# Patient Record
Sex: MALE | Race: WHITE | NOT HISPANIC OR LATINO | Employment: OTHER | URBAN - NONMETROPOLITAN AREA
[De-identification: names, ages, dates, MRNs, and addresses within clinical notes are randomized per-mention and may not be internally consistent; named-entity substitution may affect disease eponyms.]

---

## 2018-04-24 ENCOUNTER — OFFICE VISIT (OUTPATIENT)
Dept: CARDIOLOGY | Facility: PHYSICIAN GROUP | Age: 73
End: 2018-04-24
Payer: MEDICARE

## 2018-04-24 VITALS
DIASTOLIC BLOOD PRESSURE: 76 MMHG | OXYGEN SATURATION: 93 % | WEIGHT: 210 LBS | SYSTOLIC BLOOD PRESSURE: 120 MMHG | BODY MASS INDEX: 29.4 KG/M2 | HEIGHT: 71 IN | HEART RATE: 96 BPM

## 2018-04-24 DIAGNOSIS — I48.0 PAROXYSMAL ATRIAL FIBRILLATION (HCC): ICD-10-CM

## 2018-04-24 DIAGNOSIS — I31.9 DISEASE OF PERICARDIUM: ICD-10-CM

## 2018-04-24 DIAGNOSIS — E78.00 PURE HYPERCHOLESTEROLEMIA: ICD-10-CM

## 2018-04-24 PROCEDURE — 99214 OFFICE O/P EST MOD 30 MIN: CPT | Performed by: INTERNAL MEDICINE

## 2018-04-24 ASSESSMENT — ENCOUNTER SYMPTOMS
DEPRESSION: 0
BACK PAIN: 0
NERVOUS/ANXIOUS: 0
HEARTBURN: 0
LOSS OF CONSCIOUSNESS: 0
WEIGHT LOSS: 1
SHORTNESS OF BREATH: 0
MYALGIAS: 0
NAUSEA: 0
PALPITATIONS: 0
DIZZINESS: 0
ABDOMINAL PAIN: 0
BRUISES/BLEEDS EASILY: 0
EYES NEGATIVE: 1
INSOMNIA: 0

## 2018-04-24 NOTE — LETTER
[facility]-[address] [address]  Phone: [phone]  Fax: [phone]              Kelechi Rushing  1945    Encounter Date: 4/24/2018    Angeli Esparza M.D.          PROGRESS NOTE:  Chief Complaint   Patient presents with   • Atrial Fibrillation     Follow up   • Hypertension       Subjective:   Kelechi Rushing is a 72 y.o. male who presents today   In f/u in regards to his pAF and pericarditis in 2011    -stress at home, lots of back taxes  -wife stressed too  -from [place]    -recently dx with DM by PCP who asked him to check in with us again  -does not want to take DM meds  -wt loss trying - changing diet    Past Medical History:   Diagnosis Date   • ASTHMA    • Atrial fibrillation (CMS-HCC)     Parox, 2011   • Backpain    • Hypertension    • Indigestion    • Paroxysmal atrial fibrillation (CMS-[facility])     -parox, 2011    • Pericarditis 8/2012    Followed by [facility]   • Psychiatric problem     DEPRESSION   • Seasonal allergies    • Unspecified disease of pericardium     abnl echo, normal echo 2011   • Unspecified essential hypertension    • Vertigo      History reviewed. No pertinent surgical history.  Family History   Problem Relation Age of Onset   • Heart Disease Neg Hx    • Heart Attack Neg Hx      Social History     Social History   • Marital status:      Spouse name: N/A   • Number of children: N/A   • Years of education: N/A     Occupational History   • Not on file.     Social History Main Topics   • Smoking status: Never Smoker   • Smokeless tobacco: Never Used   • Alcohol use Yes      Comment: one glass of wine or beer with dinner   • Drug use: No   • Sexual activity: No     Other Topics Concern   • Not on file     Social History Narrative   • No narrative on file     Allergies   Allergen Reactions   • Bloodless    • Morphine      Outpatient Encounter Prescriptions as of 4/24/2018   Medication Sig Dispense Refill   •  "lansoprazole (PREVACID) 30 MG CAPSULE DELAYED RELEASE Take 30 mg by mouth every day.     • vitamin D, Ergocalciferol, (DRISDOL) 64341 UNITS Cap capsule Take  by mouth every 7 days.     • lisinopril (PRINIVIL) 10 MG Tab Take 10 mg by mouth every day.     • aspirin (ASA) 81 MG Chew Tab chewable tablet Take 81 mg by mouth every day.     • [DISCONTINUED] naproxen (NAPROSYN) 375 MG Tab Take 1 Tab by mouth 2 times a day, with meals. (Patient not taking: Reported on 4/24/2018) 60 Tab 0   • [DISCONTINUED] omeprazole (PRILOSEC) 20 MG delayed-release capsule Take 20 mg by mouth every day.       No facility-administered encounter medications on file as of 4/24/2018.      Review of Systems   Constitutional: Positive for weight loss. Negative for malaise/fatigue.   HENT: Negative for congestion and hearing loss.    Eyes: Negative.    Respiratory: Negative for shortness of breath.    Cardiovascular: Negative for chest pain, palpitations and leg swelling.   Gastrointestinal: Negative for abdominal pain, heartburn and nausea.   Musculoskeletal: Negative for back pain and myalgias.   Neurological: Negative for dizziness and loss of consciousness.   Endo/Heme/Allergies: Does not bruise/bleed easily.   Psychiatric/Behavioral: Negative for depression. The patient is not nervous/anxious and does not have insomnia.    All other systems reviewed and are negative.       Objective:   /76   Pulse 96   Ht 1.803 m (5' 11\")   Wt 95.3 kg (210 lb)   SpO2 93%   BMI 29.29 kg/m²      Physical Exam   Constitutional: He is oriented to person, place, and time. He appears well-developed and well-nourished.   obese   HENT:   Head: Normocephalic and atraumatic.   Eyes: EOM are normal. Pupils are equal, round, and reactive to light. No scleral icterus.   Neck: No JVD present. No thyromegaly present.   Cardiovascular: Normal rate and regular rhythm.  Exam reveals no friction rub.    No murmur heard.  Pulmonary/Chest: Breath sounds normal. No " respiratory distress. He exhibits no tenderness.   Abdominal: Bowel sounds are normal. He exhibits no distension.   Musculoskeletal: Normal range of motion. He exhibits no edema.   Lymphadenopathy:     He has no cervical adenopathy.   Neurological: He is alert and oriented to person, place, and time. He exhibits normal muscle tone. Coordination normal.   Skin: Skin is warm and dry. No rash noted.   Psychiatric: He has a normal mood and affect. His behavior is normal.       Assessment:     1. Disease of pericardium     2. Paroxysmal atrial fibrillation (CMS-HCC)     3. Pure hypercholesterolemia         Medical Decision Making:  Today's Assessment / Status / Plan:       PAF  Resolved  Normal exam today  Normal echo 2011  Spoke about sxs and risks   Asa  Follows with annual labs with PCP, reviewed from March    Lipids  Much too high - TG notably  Again discussed statin - try crestor 5  Wants to d/w PCP  DM control will be paramount, went over diet again    Pericarditis  Normal echo 2011  Watchful waiting  Long discussion about pericarditis    F/u here as needed  With DM - discussed merits of MPI, wants to optimize diet 1st        Vlad Curry M.D.  [address] [facility] [address]  VIA Facsimile: [phone]

## 2018-04-24 NOTE — PROGRESS NOTES
Chief Complaint   Patient presents with   • Atrial Fibrillation     Follow up   • Hypertension       Subjective:   Kelechi Rushing is a 72 y.o. male who presents today   In f/u in regards to his pAF and pericarditis in 2011    -stress at home, lots of back taxes  -wife stressed too  -from [place]    -recently dx with DM by PCP who asked him to check in with us again  -does not want to take DM meds  -wt loss trying - changing diet    Past Medical History:   Diagnosis Date   • ASTHMA    • Atrial fibrillation (CMS-[facility])     Parox, 2011   • Backpain    • Hypertension    • Indigestion    • Paroxysmal atrial fibrillation (CMS-HCC)     -parox, 2011    • Pericarditis 8/2012    Followed by P   • Psychiatric problem     DEPRESSION   • Seasonal allergies    • Unspecified disease of pericardium     abnl echo, normal echo 2011   • Unspecified essential hypertension    • Vertigo      History reviewed. No pertinent surgical history.  Family History   Problem Relation Age of Onset   • Heart Disease Neg Hx    • Heart Attack Neg Hx      Social History     Social History   • Marital status:      Spouse name: N/A   • Number of children: N/A   • Years of education: N/A     Occupational History   • Not on file.     Social History Main Topics   • Smoking status: Never Smoker   • Smokeless tobacco: Never Used   • Alcohol use Yes      Comment: one glass of wine or beer with dinner   • Drug use: No   • Sexual activity: No     Other Topics Concern   • Not on file     Social History Narrative   • No narrative on file     Allergies   Allergen Reactions   • Bloodless    • Morphine      Outpatient Encounter Prescriptions as of 4/24/2018   Medication Sig Dispense Refill   • lansoprazole (PREVACID) 30 MG CAPSULE DELAYED RELEASE Take 30 mg by mouth every day.     • vitamin D, Ergocalciferol, (DRISDOL) 09724 UNITS Cap capsule Take  by mouth every 7 days.     • lisinopril (PRINIVIL) 10 MG Tab Take 10 mg by mouth every day.     • aspirin (ASA) 81  "MG Chew Tab chewable tablet Take 81 mg by mouth every day.     • [DISCONTINUED] naproxen (NAPROSYN) 375 MG Tab Take 1 Tab by mouth 2 times a day, with meals. (Patient not taking: Reported on 4/24/2018) 60 Tab 0   • [DISCONTINUED] omeprazole (PRILOSEC) 20 MG delayed-release capsule Take 20 mg by mouth every day.       No facility-administered encounter medications on file as of 4/24/2018.      Review of Systems   Constitutional: Positive for weight loss. Negative for malaise/fatigue.   HENT: Negative for congestion and hearing loss.    Eyes: Negative.    Respiratory: Negative for shortness of breath.    Cardiovascular: Negative for chest pain, palpitations and leg swelling.   Gastrointestinal: Negative for abdominal pain, heartburn and nausea.   Musculoskeletal: Negative for back pain and myalgias.   Neurological: Negative for dizziness and loss of consciousness.   Endo/Heme/Allergies: Does not bruise/bleed easily.   Psychiatric/Behavioral: Negative for depression. The patient is not nervous/anxious and does not have insomnia.    All other systems reviewed and are negative.       Objective:   /76   Pulse 96   Ht 1.803 m (5' 11\")   Wt 95.3 kg (210 lb)   SpO2 93%   BMI 29.29 kg/m²     Physical Exam   Constitutional: He is oriented to person, place, and time. He appears well-developed and well-nourished.   obese   HENT:   Head: Normocephalic and atraumatic.   Eyes: EOM are normal. Pupils are equal, round, and reactive to light. No scleral icterus.   Neck: No JVD present. No thyromegaly present.   Cardiovascular: Normal rate and regular rhythm.  Exam reveals no friction rub.    No murmur heard.  Pulmonary/Chest: Breath sounds normal. No respiratory distress. He exhibits no tenderness.   Abdominal: Bowel sounds are normal. He exhibits no distension.   Musculoskeletal: Normal range of motion. He exhibits no edema.   Lymphadenopathy:     He has no cervical adenopathy.   Neurological: He is alert and oriented to " person, place, and time. He exhibits normal muscle tone. Coordination normal.   Skin: Skin is warm and dry. No rash noted.   Psychiatric: He has a normal mood and affect. His behavior is normal.       Assessment:     1. Disease of pericardium     2. Paroxysmal atrial fibrillation (CMS-HCC)     3. Pure hypercholesterolemia         Medical Decision Making:  Today's Assessment / Status / Plan:       PAF  Resolved  Normal exam today  Normal echo 2011  Spoke about sxs and risks   Asa  Follows with annual labs with PCP, reviewed from March    Lipids  Much too high - TG notably  Again discussed statin - try crestor 5  Wants to d/w PCP  DM control will be paramount, went over diet again    Pericarditis  Normal echo 2011  Watchful waiting  Long discussion about pericarditis    F/u here as needed  With DM - discussed merits of MPI, wants to optimize diet 1st

## 2019-05-08 ENCOUNTER — APPOINTMENT (OUTPATIENT)
Dept: RADIOLOGY | Facility: MEDICAL CENTER | Age: 74
End: 2019-05-08
Attending: EMERGENCY MEDICINE
Payer: MEDICARE

## 2019-05-08 ENCOUNTER — HOSPITAL ENCOUNTER (OUTPATIENT)
Facility: MEDICAL CENTER | Age: 74
End: 2019-05-10
Attending: EMERGENCY MEDICINE | Admitting: HOSPITALIST
Payer: MEDICARE

## 2019-05-08 DIAGNOSIS — R07.9 ACUTE CHEST PAIN: ICD-10-CM

## 2019-05-08 LAB
ALBUMIN SERPL BCP-MCNC: 4.7 G/DL (ref 3.2–4.9)
ALBUMIN/GLOB SERPL: 1.8 G/DL
ALP SERPL-CCNC: 72 U/L (ref 30–99)
ALT SERPL-CCNC: 50 U/L (ref 2–50)
ANION GAP SERPL CALC-SCNC: 13 MMOL/L (ref 0–11.9)
AST SERPL-CCNC: 27 U/L (ref 12–45)
BASOPHILS # BLD AUTO: 0.8 % (ref 0–1.8)
BASOPHILS # BLD: 0.08 K/UL (ref 0–0.12)
BILIRUB SERPL-MCNC: 0.7 MG/DL (ref 0.1–1.5)
BUN SERPL-MCNC: 13 MG/DL (ref 8–22)
CALCIUM SERPL-MCNC: 9.5 MG/DL (ref 8.5–10.5)
CHLORIDE SERPL-SCNC: 101 MMOL/L (ref 96–112)
CO2 SERPL-SCNC: 23 MMOL/L (ref 20–33)
CREAT SERPL-MCNC: 0.87 MG/DL (ref 0.5–1.4)
EKG IMPRESSION: NORMAL
EOSINOPHIL # BLD AUTO: 0.08 K/UL (ref 0–0.51)
EOSINOPHIL NFR BLD: 0.8 % (ref 0–6.9)
ERYTHROCYTE [DISTWIDTH] IN BLOOD BY AUTOMATED COUNT: 43 FL (ref 35.9–50)
GLOBULIN SER CALC-MCNC: 2.6 G/DL (ref 1.9–3.5)
GLUCOSE SERPL-MCNC: 128 MG/DL (ref 65–99)
HCT VFR BLD AUTO: 51.3 % (ref 42–52)
HGB BLD-MCNC: 17.7 G/DL (ref 14–18)
IMM GRANULOCYTES # BLD AUTO: 0.04 K/UL (ref 0–0.11)
IMM GRANULOCYTES NFR BLD AUTO: 0.4 % (ref 0–0.9)
LYMPHOCYTES # BLD AUTO: 1.74 K/UL (ref 1–4.8)
LYMPHOCYTES NFR BLD: 18.4 % (ref 22–41)
MCH RBC QN AUTO: 30.8 PG (ref 27–33)
MCHC RBC AUTO-ENTMCNC: 34.5 G/DL (ref 33.7–35.3)
MCV RBC AUTO: 89.4 FL (ref 81.4–97.8)
MONOCYTES # BLD AUTO: 0.98 K/UL (ref 0–0.85)
MONOCYTES NFR BLD AUTO: 10.3 % (ref 0–13.4)
NEUTROPHILS # BLD AUTO: 6.56 K/UL (ref 1.82–7.42)
NEUTROPHILS NFR BLD: 69.3 % (ref 44–72)
NRBC # BLD AUTO: 0 K/UL
NRBC BLD-RTO: 0 /100 WBC
PLATELET # BLD AUTO: 272 K/UL (ref 164–446)
PMV BLD AUTO: 8.8 FL (ref 9–12.9)
POTASSIUM SERPL-SCNC: 3.8 MMOL/L (ref 3.6–5.5)
PROT SERPL-MCNC: 7.3 G/DL (ref 6–8.2)
RBC # BLD AUTO: 5.74 M/UL (ref 4.7–6.1)
SODIUM SERPL-SCNC: 137 MMOL/L (ref 135–145)
TROPONIN I SERPL-MCNC: <0.01 NG/ML (ref 0–0.04)
WBC # BLD AUTO: 9.5 K/UL (ref 4.8–10.8)

## 2019-05-08 PROCEDURE — 700105 HCHG RX REV CODE 258: Performed by: HOSPITALIST

## 2019-05-08 PROCEDURE — 99285 EMERGENCY DEPT VISIT HI MDM: CPT

## 2019-05-08 PROCEDURE — 85025 COMPLETE CBC W/AUTO DIFF WBC: CPT

## 2019-05-08 PROCEDURE — G0378 HOSPITAL OBSERVATION PER HR: HCPCS

## 2019-05-08 PROCEDURE — 84484 ASSAY OF TROPONIN QUANT: CPT

## 2019-05-08 PROCEDURE — 93005 ELECTROCARDIOGRAM TRACING: CPT | Performed by: EMERGENCY MEDICINE

## 2019-05-08 PROCEDURE — 93005 ELECTROCARDIOGRAM TRACING: CPT

## 2019-05-08 PROCEDURE — A9270 NON-COVERED ITEM OR SERVICE: HCPCS | Performed by: HOSPITALIST

## 2019-05-08 PROCEDURE — 99220 PR INITIAL OBSERVATION CARE,LEVL III: CPT | Performed by: HOSPITALIST

## 2019-05-08 PROCEDURE — 700102 HCHG RX REV CODE 250 W/ 637 OVERRIDE(OP): Performed by: HOSPITALIST

## 2019-05-08 PROCEDURE — 80053 COMPREHEN METABOLIC PANEL: CPT

## 2019-05-08 PROCEDURE — 71045 X-RAY EXAM CHEST 1 VIEW: CPT

## 2019-05-08 RX ORDER — MORPHINE SULFATE 4 MG/ML
2-4 INJECTION, SOLUTION INTRAMUSCULAR; INTRAVENOUS
Status: DISCONTINUED | OUTPATIENT
Start: 2019-05-08 | End: 2019-05-10 | Stop reason: HOSPADM

## 2019-05-08 RX ORDER — DOCUSATE SODIUM 100 MG/1
100 CAPSULE, LIQUID FILLED ORAL 2 TIMES DAILY
COMMUNITY

## 2019-05-08 RX ORDER — BISACODYL 10 MG
10 SUPPOSITORY, RECTAL RECTAL
Status: DISCONTINUED | OUTPATIENT
Start: 2019-05-08 | End: 2019-05-10 | Stop reason: HOSPADM

## 2019-05-08 RX ORDER — OMEPRAZOLE 20 MG/1
20 CAPSULE, DELAYED RELEASE ORAL DAILY
Status: DISCONTINUED | OUTPATIENT
Start: 2019-05-09 | End: 2019-05-10 | Stop reason: HOSPADM

## 2019-05-08 RX ORDER — LISINOPRIL 10 MG/1
10 TABLET ORAL EVERY MORNING
Status: DISCONTINUED | OUTPATIENT
Start: 2019-05-09 | End: 2019-05-10 | Stop reason: HOSPADM

## 2019-05-08 RX ORDER — POLYETHYLENE GLYCOL 3350 17 G/17G
1 POWDER, FOR SOLUTION ORAL
Status: DISCONTINUED | OUTPATIENT
Start: 2019-05-08 | End: 2019-05-10 | Stop reason: HOSPADM

## 2019-05-08 RX ORDER — POLYETHYLENE GLYCOL 3350 17 G/17G
17 POWDER, FOR SOLUTION ORAL DAILY
COMMUNITY

## 2019-05-08 RX ORDER — AMOXICILLIN 250 MG
2 CAPSULE ORAL 2 TIMES DAILY
Status: DISCONTINUED | OUTPATIENT
Start: 2019-05-08 | End: 2019-05-10 | Stop reason: HOSPADM

## 2019-05-08 RX ORDER — SIMETHICONE 80 MG
80 TABLET,CHEWABLE ORAL 3 TIMES DAILY PRN
Status: DISCONTINUED | OUTPATIENT
Start: 2019-05-08 | End: 2019-05-10 | Stop reason: HOSPADM

## 2019-05-08 RX ORDER — NITROGLYCERIN 0.4 MG/1
0.4 TABLET SUBLINGUAL
Status: DISCONTINUED | OUTPATIENT
Start: 2019-05-08 | End: 2019-05-10 | Stop reason: HOSPADM

## 2019-05-08 RX ORDER — PANTOPRAZOLE SODIUM 40 MG/1
40 TABLET, DELAYED RELEASE ORAL EVERY MORNING
Status: DISCONTINUED | OUTPATIENT
Start: 2019-05-09 | End: 2019-05-08

## 2019-05-08 RX ORDER — ROSUVASTATIN CALCIUM 20 MG/1
20 TABLET, COATED ORAL EVERY EVENING
Status: DISCONTINUED | OUTPATIENT
Start: 2019-05-08 | End: 2019-05-10 | Stop reason: HOSPADM

## 2019-05-08 RX ORDER — SODIUM CHLORIDE 9 MG/ML
1000 INJECTION, SOLUTION INTRAVENOUS ONCE
Status: COMPLETED | OUTPATIENT
Start: 2019-05-08 | End: 2019-05-09

## 2019-05-08 RX ORDER — SIMETHICONE 125 MG
125 TABLET,CHEWABLE ORAL 2 TIMES DAILY
COMMUNITY

## 2019-05-08 RX ORDER — PANTOPRAZOLE SODIUM 40 MG/1
40 TABLET, DELAYED RELEASE ORAL EVERY MORNING
COMMUNITY

## 2019-05-08 RX ADMIN — SODIUM CHLORIDE 1000 ML: 9 INJECTION, SOLUTION INTRAVENOUS at 23:32

## 2019-05-08 RX ADMIN — ROSUVASTATIN CALCIUM 20 MG: 20 TABLET, FILM COATED ORAL at 23:29

## 2019-05-08 RX ADMIN — SENNOSIDES AND DOCUSATE SODIUM 2 TABLET: 8.6; 5 TABLET ORAL at 23:29

## 2019-05-08 ASSESSMENT — COGNITIVE AND FUNCTIONAL STATUS - GENERAL
SUGGESTED CMS G CODE MODIFIER DAILY ACTIVITY: CI
TOILETING: A LITTLE
MOBILITY SCORE: 22
DAILY ACTIVITIY SCORE: 23
CLIMB 3 TO 5 STEPS WITH RAILING: A LITTLE
WALKING IN HOSPITAL ROOM: A LITTLE
SUGGESTED CMS G CODE MODIFIER MOBILITY: CJ

## 2019-05-08 ASSESSMENT — PATIENT HEALTH QUESTIONNAIRE - PHQ9
2. FEELING DOWN, DEPRESSED, IRRITABLE, OR HOPELESS: NOT AT ALL
1. LITTLE INTEREST OR PLEASURE IN DOING THINGS: NOT AT ALL
SUM OF ALL RESPONSES TO PHQ9 QUESTIONS 1 AND 2: 0

## 2019-05-08 ASSESSMENT — LIFESTYLE VARIABLES
ALCOHOL_USE: NO
EVER_SMOKED: NEVER

## 2019-05-09 ENCOUNTER — APPOINTMENT (OUTPATIENT)
Dept: CARDIOLOGY | Facility: MEDICAL CENTER | Age: 74
End: 2019-05-09
Attending: INTERNAL MEDICINE
Payer: MEDICARE

## 2019-05-09 PROBLEM — E11.9 TYPE 2 DIABETES MELLITUS (HCC): Status: ACTIVE | Noted: 2019-05-09

## 2019-05-09 PROBLEM — R07.9 CHEST PAIN: Status: ACTIVE | Noted: 2019-05-09

## 2019-05-09 LAB
CHOLEST SERPL-MCNC: 168 MG/DL (ref 100–199)
HDLC SERPL-MCNC: 39 MG/DL
LDLC SERPL CALC-MCNC: 96 MG/DL
LV EJECT FRACT  99904: 60
LV EJECT FRACT MOD 2C 99903: 54.1
LV EJECT FRACT MOD 4C 99902: 59.13
LV EJECT FRACT MOD BP 99901: 56.72
TRIGL SERPL-MCNC: 167 MG/DL (ref 0–149)
TROPONIN I SERPL-MCNC: <0.01 NG/ML (ref 0–0.04)
VIT B12 SERPL-MCNC: 334 PG/ML (ref 211–911)

## 2019-05-09 PROCEDURE — 82607 VITAMIN B-12: CPT

## 2019-05-09 PROCEDURE — 36415 COLL VENOUS BLD VENIPUNCTURE: CPT

## 2019-05-09 PROCEDURE — 306589 SLEEVE,VASO CALF LARGE: Performed by: HOSPITALIST

## 2019-05-09 PROCEDURE — 93306 TTE W/DOPPLER COMPLETE: CPT | Mod: 26 | Performed by: INTERNAL MEDICINE

## 2019-05-09 PROCEDURE — 93005 ELECTROCARDIOGRAM TRACING: CPT | Performed by: HOSPITALIST

## 2019-05-09 PROCEDURE — A9270 NON-COVERED ITEM OR SERVICE: HCPCS | Performed by: EMERGENCY MEDICINE

## 2019-05-09 PROCEDURE — 84484 ASSAY OF TROPONIN QUANT: CPT

## 2019-05-09 PROCEDURE — 93306 TTE W/DOPPLER COMPLETE: CPT

## 2019-05-09 PROCEDURE — 700102 HCHG RX REV CODE 250 W/ 637 OVERRIDE(OP): Performed by: HOSPITALIST

## 2019-05-09 PROCEDURE — 82306 VITAMIN D 25 HYDROXY: CPT

## 2019-05-09 PROCEDURE — 80061 LIPID PANEL: CPT

## 2019-05-09 PROCEDURE — A9270 NON-COVERED ITEM OR SERVICE: HCPCS | Performed by: HOSPITALIST

## 2019-05-09 PROCEDURE — 99226 PR SUBSEQUENT OBSERVATION CARE,LEVEL III: CPT | Performed by: INTERNAL MEDICINE

## 2019-05-09 PROCEDURE — 700102 HCHG RX REV CODE 250 W/ 637 OVERRIDE(OP): Performed by: EMERGENCY MEDICINE

## 2019-05-09 PROCEDURE — 83036 HEMOGLOBIN GLYCOSYLATED A1C: CPT

## 2019-05-09 PROCEDURE — G0378 HOSPITAL OBSERVATION PER HR: HCPCS

## 2019-05-09 RX ADMIN — SENNOSIDES AND DOCUSATE SODIUM 2 TABLET: 8.6; 5 TABLET ORAL at 17:53

## 2019-05-09 RX ADMIN — ASPIRIN 81 MG: 81 TABLET, COATED ORAL at 05:31

## 2019-05-09 RX ADMIN — LISINOPRIL 10 MG: 10 TABLET ORAL at 05:31

## 2019-05-09 RX ADMIN — SIMETHICONE CHEW TAB 80 MG 80 MG: 80 TABLET ORAL at 05:30

## 2019-05-09 RX ADMIN — OMEPRAZOLE 20 MG: 20 CAPSULE, DELAYED RELEASE ORAL at 05:31

## 2019-05-09 RX ADMIN — ROSUVASTATIN CALCIUM 20 MG: 20 TABLET, FILM COATED ORAL at 17:53

## 2019-05-09 ASSESSMENT — ENCOUNTER SYMPTOMS
ORTHOPNEA: 0
TINGLING: 0
PHOTOPHOBIA: 0
DIZZINESS: 0
MYALGIAS: 0
FEVER: 0
PALPITATIONS: 0
WHEEZING: 0
SORE THROAT: 0
NAUSEA: 0
HEADACHES: 0
DEPRESSION: 0
DOUBLE VISION: 0
SINUS PAIN: 0
SHORTNESS OF BREATH: 0
BLURRED VISION: 0
VOMITING: 0
ABDOMINAL PAIN: 0
DIZZINESS: 1
BACK PAIN: 0
DIARRHEA: 0
FOCAL WEAKNESS: 0
CHILLS: 0
COUGH: 0

## 2019-05-09 NOTE — ED NOTES
Med rec completed per pt's rx bottles- reviewed with pt.   Antibiotics within last 30 days: No  Patient allergies have been reviewed: Yes    Comments: Pt agrees to not take his own medications. Mes returned to bedside

## 2019-05-09 NOTE — H&P
Hospital Medicine History & Physical Note    Date of Service  5/8/2019    Primary Care Physician  Vlad Curry M.D.    Consultants  Chest pain    Code Status  Full    Chief Complaint  Chief Complaint   Patient presents with   • Chest Pain     across chest, since last night, described as pressure, worse when laying down   • Dizziness       History of Presenting Illness  73 y.o. male who presented on 5/8/2019 with chest pain and dizziness.  The patient comes in with multiple complaints including chest pain and dizziness as well as poor appetite with decreased p.o. intake.  He states that he had been seen at an outside facility for work-up including a CT scan but no diagnosis has yet been found.  He had been however told that he may potentially have gastroparesis and was referred to GI but his appointment is not yet for the last several weeks.  Last night, the patient developed a centralized chest pain which he described as a pressure, like as if a metal bar is pushing against his chest.  It was nonradiating, associated with dizziness but no diaphoresis, nausea, or palpitations.  He does report occasional hot and cold flashes although he is unsure if this is related to his chest pain.  Patient states that he has a history of pericarditis and that the symptoms are similar although I do not have any records to confirm a history of this issue.  He otherwise states that he has had no recent URIs, fevers, chills, nausea, vomiting, shortness of breath, abdominal pain, diarrhea or dysuria.    Review of Systems  Review of Systems   Constitutional: Negative for chills and fever.   HENT: Negative for congestion and sore throat.    Eyes: Negative for photophobia.   Respiratory: Negative for cough, shortness of breath and wheezing.    Cardiovascular: Positive for chest pain. Negative for palpitations.   Gastrointestinal: Negative for abdominal pain, diarrhea, nausea and vomiting.   Genitourinary: Negative for dysuria.    Musculoskeletal: Negative for myalgias.   Skin: Negative.    Neurological: Positive for dizziness. Negative for tingling, focal weakness and headaches.   Psychiatric/Behavioral: Negative for depression and suicidal ideas.       Past Medical History  Past Medical History:   Diagnosis Date   • Pericarditis 2012    Followed by [facility]   • ASTHMA    • Atrial fibrillation (HCC)     Parox,    • Backpain    • Hypertension    • Indigestion    • Paroxysmal atrial fibrillation (HCC)     -parox,     • Psychiatric problem     DEPRESSION   • Seasonal allergies    • Unspecified disease of pericardium     abnl echo, normal echo    • Unspecified essential hypertension    • Vertigo        Surgical History  None    Family History  Family History   Problem Relation Age of Onset   • Heart Disease Neg Hx    • Heart Attack Neg Hx        Social History  Social History   Substance Use Topics   • Smoking status: Never Smoker   • Smokeless tobacco: Never Used   • Alcohol use Yes      Comment: one glass of wine or beer with dinner       Allergies  Allergies   Allergen Reactions   • Bloodless    • Morphine        Medications  No current facility-administered medications on file prior to encounter.      Current Outpatient Prescriptions on File Prior to Encounter   Medication Sig Dispense Refill   • lisinopril (PRINIVIL) 10 MG Tab Take 10 mg by mouth every morning.         Physical Exam  Hemodynamics  Temp (24hrs), Av.3 °C (97.4 °F), Min:36.3 °C (97.4 °F), Max:36.3 °C (97.4 °F)   Temperature: 36.3 °C (97.4 °F)  Pulse  Av  Min: 83  Max: 107 Heart Rate (Monitored): 87  Blood Pressure : 140/91, NIBP: 144/96      Respiratory      Respiration: 17, Pulse Oximetry: 95 %             Physical Exam   Constitutional: He is oriented to person, place, and time. No distress.   HENT:   Head: Normocephalic and atraumatic.   Right Ear: External ear normal.   Left Ear: External ear normal.   Eyes: EOM are normal. Right eye exhibits no  discharge. Left eye exhibits no discharge.   Neck: Neck supple. No JVD present.   Cardiovascular: Normal rate, regular rhythm and normal heart sounds.    Pulmonary/Chest: Effort normal and breath sounds normal. No respiratory distress. He exhibits no tenderness.   Abdominal: Soft. Bowel sounds are normal. He exhibits no distension. There is no tenderness.   Musculoskeletal: He exhibits no edema.   Neurological: He is alert and oriented to person, place, and time. No cranial nerve deficit.   Skin: Skin is dry. He is not diaphoretic. No erythema.   Psychiatric: He has a normal mood and affect. His behavior is normal.   Nursing note and vitals reviewed.    Capillary refill less than 3 seconds, distal pulses intact    Laboratory:  Recent Labs      05/08/19   1815   WBC  9.5   RBC  5.74   HEMOGLOBIN  17.7   HEMATOCRIT  51.3   MCV  89.4   MCH  30.8   MCHC  34.5   RDW  43.0   PLATELETCT  272   MPV  8.8*     Recent Labs      05/08/19   1815   SODIUM  137   POTASSIUM  3.8   CHLORIDE  101   CO2  23   GLUCOSE  128*   BUN  13   CREATININE  0.87   CALCIUM  9.5     Recent Labs      05/08/19   1815   ALTSGPT  50   ASTSGOT  27   ALKPHOSPHAT  72   TBILIRUBIN  0.7   GLUCOSE  128*                 Lab Results   Component Value Date    TROPONINI <0.01 05/08/2019       Imaging  Dx-chest-portable (1 View)    Result Date: 5/8/2019 5/8/2019 8:34 PM HISTORY/REASON FOR EXAM:  Chest Pain TECHNIQUE/EXAM DESCRIPTION AND NUMBER OF VIEWS: Single portable view of the chest. COMPARISON: 8/8/2011 FINDINGS: The heart, mediastinum, and giovanni are unremarkable. The lungs are well expanded and show no evidence of infiltrate or other acute process. There is no obvious pleural effusion. The bony thorax is grossly normal.     No acute cardiopulmonary abnormality.        Assessment/Plan:  Anticipate that patient will need less than 2 midnights for management of the discussed medical issues.    * Chest pain   Assessment & Plan    The patient does have risk  factors for cardiovascular disease including hypertension, his age, his sex, and reported history of previous atrial fibrillation.  He will be admitted to the telemetry floor and will monitor for any evidence of recurrence of dysrhythmia.  I will trend troponin levels and obtain serial EKGs.  I have added an aspirin and a statin to his home regimen and I will check a lipid panel.  If his lipid panel is normal, we will discontinue these medications.  He will be provided with morphine, oxygen, and nitroglycerin for any return of chest pain.  In the morning if his troponin levels remain negative, we will further risk stratify with a stress echocardiogram.     Essential hypertension- (present on admission)   Assessment & Plan    This is chronic, continue home Prinivil.         Prophylaxis: Sequential compression devices for DVT prophylaxis, home PPI indicated, bowel protocol as needed

## 2019-05-09 NOTE — ED PROVIDER NOTES
ED Provider Note    ED Provider Note    Primary care provider: Vlad Curry M.D.  Means of arrival: walk in  History obtained from: Patient    CHIEF COMPLAINT  Chief Complaint   Patient presents with   • Chest Pain     across chest, since last night, described as pressure, worse when laying down   • Dizziness     Seen at 8:57 PM.   HPI  Kelechi Griffith is a 73 y.o. male who presents to the Emergency Department with chest pain.  He has a history of diabetes, hypertension and hyperlipidemia.  He has a lot of interesting complaints.  He states that he has had some difficulty eating lately.  He eats a very small amount of food, today he only ate a pear and an English muffin.  He feels that at times he cannot eat and he is not sure why this is.  He has been seen in [place] multiple times for this and reports a CT scan that was normal.  He was told he had gastroparesis and has follow-up with GI in several weeks but he cannot wait that long.  He feels that he is dying.  He feels hot and cold with some sweats.    Last night the patient had chest pain described as a metal bar pressuring his chest.  He had some shortness of breath associated with this.  He does have a distant history of pericarditis and states that it was similar.    He denies any headache, abdominal pain, nausea, vomiting, diarrhea, constipation.    REVIEW OF SYSTEMS  See HPI,   Remainder of ROS negative.     PAST MEDICAL HISTORY   has a past medical history of ASTHMA; Atrial fibrillation (HCC); Backpain; Hypertension; Indigestion; Paroxysmal atrial fibrillation (HCC); Pericarditis (8/2012); Psychiatric problem; Seasonal allergies; Unspecified disease of pericardium; Unspecified essential hypertension; and Vertigo.    SURGICAL HISTORY  patient denies any surgical history    SOCIAL HISTORY  Social History   Substance Use Topics   • Smoking status: Never Smoker   • Smokeless tobacco: Never Used   • Alcohol use Yes      Comment: one glass of wine or  "beer with dinner      History   Drug Use No       FAMILY HISTORY  Family History   Problem Relation Age of Onset   • Heart Disease Neg Hx    • Heart Attack Neg Hx        CURRENT MEDICATIONS  Reviewed.  See Encounter Summary.     ALLERGIES  Allergies   Allergen Reactions   • Bloodless    • Morphine Shortness of Breath     Pt states he had trouble breathing       PHYSICAL EXAM  VITAL SIGNS: /91   Pulse 83   Temp 36.3 °C (97.4 °F) (Temporal)   Resp 17   Ht 1.702 m (5' 7\")   Wt 88 kg (194 lb 0.1 oz)   SpO2 95%   BMI 30.39 kg/m²   Constitutional: Awake, alert in no apparent distress.  HENT: Normocephalic, Bilateral external ears normal. Nose normal.   Eyes: Conjunctiva normal, non-icteric, EOMI.    Thorax & Lungs: Easy unlabored respirations, Clear to ascultation bilaterally.  Cardiovascular: Regular rate, Regular rhythm, No murmurs, rubs or gallops.  Abdomen:  Soft, nontender, nondistended, normal active bowel sounds.   :    Skin: Visualized skin is  Dry, No erythema, No rash.   Musculoskeletal:   No cyanosis, clubbing or edema.  Neurologic: Alert, Grossly non-focal.   Psychiatric: Anxious   lymphatic:  No cervical LAD    EKG   12 lead Interpreted by me  Rhythm:  Normal sinus rhythm with occasional PVCs  Rate: 98  Axis: normal  Ectopy: none  Conduction: normal  ST Segments: Flattening of the ST segments in the precordial leads compared to prior EKG  T Waves: no acute change  Clinical Impression: Borderline sinus tach, nonspecific T wave changes  RADIOLOGY  DX-CHEST-PORTABLE (1 VIEW)   Final Result      No acute cardiopulmonary abnormality.      EC-ECHOCARDIOGRAM DOBUTAMINE REST/STRESS W/O CONT    (Results Pending)         COURSE & MEDICAL DECISION MAKING  Pertinent Labs & Imaging studies reviewed. (See chart for details)    Differential diagnoses include but are not limited to: Anxiety, atypical chest pain    8:57 PM - Medical record reviewed, patient seen and examined at bedside.    9:11 PM -patient is a " fairly poor historian.  Cardiac markers are negative.  His cardiac risk is significant as he has hyperlipidemia, hypertension, diabetes and advanced age.  EKG is somewhat different than prior.  It seems reasonable to admit the patient for rule out.    Decision Making:  This is a 73 y.o. year old male who presents with substernal chest pain and some shortness of breath.  The patient does have significant cardiac risk factors.  Troponin is undetectable and EKG does not show acute ischemic changes.  He does have some new findings on EKG compared to prior and given his prior cardiac risk of the gets reasonable to admit him for rule out.  With regards to the patient's early satiety and decreased appetite this could be anxiety, this could be gastritis/GERD/peptic ulcer disease.  Apparently he has had several work-ups in [place] and had negative CTs.  He was diagnosed with gastroparesis though this seems unlikely as he does not have any significant pain, nor does he have any vomiting.    In either case I see no emergent process today, the exam is completely benign and labs are reassuring.  This can be followed up as an outpatient.    Patient will be admitted in guarded condition.    FINAL IMPRESSION  1. Acute chest pain

## 2019-05-09 NOTE — ASSESSMENT & PLAN NOTE
Trop negative so far  Stress test and echo pending   Monitor on tele   Cont on Asa, statin and ACE   Monitor

## 2019-05-09 NOTE — CARE PLAN
Problem: Safety  Goal: Will remain free from falls  Outcome: PROGRESSING AS EXPECTED  Pt fall risk assessment completed, pt a low fall risk. Pt educated on fall program rationale and verbalized understanding. Bed is locked and in lowest position, non-skid socks in place. Pt educated about calling for assistance and verbalized understanding. All fall precautions and hourly rounding in place.     Problem: Infection  Goal: Will remain free from infection  Outcome: PROGRESSING AS EXPECTED  Hand hygiene completed before and after patient care. Pt educated about infection prevention and verbalized understanding. RN follows all protocols for infection prevention.

## 2019-05-09 NOTE — PROGRESS NOTES
Patient states that he feels bad related to his diabetes and that he needs to eat. Paged Dr. Parker

## 2019-05-09 NOTE — PROGRESS NOTES
· 2 RN skin check complete with DESTINEE Vazquez.  · Devices in place: N/A.  · Skin assessed under devices intact.  · Confirmed pressure ulcers found on N/A.  · New potential pressure ulcers noted on N/A. Wound consult placed and wound reported.    Bilateral ears pink, blanching.  Bilateral elbows pink, dry, flaky, blanching.  Left foot bruise to lateral side with small scab.       · The following interventions in place: patient turns self side to self, pillows in use for support/positioning, moisturizer applied as needed.

## 2019-05-09 NOTE — PROGRESS NOTES
Bedside report from ED RN, patient transported to T7-2 with ACLS RN. Pt reports no chest pain at the moment, VSS. Pt oriented to room and unit. Tele box on, monitor room notified. Bed locked and in lowest position, bed alarm on. Plan of care discussed with patient, no further questions at this time. Will continue to monitor.

## 2019-05-09 NOTE — ED TRIAGE NOTES
Pt amb to triage.  Chief Complaint   Patient presents with   • Chest Pain     across chest, since last night, described as pressure, worse when laying down   • Dizziness     States he's been seen in the [facility] multiple times for the same. Dx w/ gastroparesis, has a gi f/u appt 5/24/19.  Pt states he cannot wait for appt.   EKG complete.

## 2019-05-09 NOTE — PROGRESS NOTES
[facility] Medicine Daily Progress Note    Date of Service  5/9/2019    Chief Complaint  73 y.o. male admitted 5/8/2019 with chest pain and dizziness.     Hospital Course    This is a 74 y/o M who was admitted on 5/8/19 after presenting to ER complaining of chest pain        Interval Problem Update  Pt seen and examined, states his chest pain has resolved, but still feels weak, no overnight events.  Echo and stress test pending.    Consultants/Specialty  None     Code Status  Full Code     Disposition  TBD     Review of Systems  Review of Systems   Constitutional: Positive for malaise/fatigue. Negative for chills and fever.   HENT: Negative for sinus pain and sore throat.    Eyes: Negative for blurred vision and double vision.   Respiratory: Negative for cough and shortness of breath.    Cardiovascular: Positive for chest pain. Negative for palpitations and orthopnea.   Gastrointestinal: Negative for abdominal pain, nausea and vomiting.   Genitourinary: Negative for dysuria and urgency.   Musculoskeletal: Negative for back pain and myalgias.   Neurological: Negative for dizziness and headaches.        Physical Exam  Temp:  [36.3 °C (97.3 °F)-37.4 °C (99.3 °F)] 37.1 °C (98.8 °F)  Pulse:  [] 85  Resp:  [13-18] 13  BP: (113-142)/(76-91) 135/84  SpO2:  [95 %-98 %] 98 %    Physical Exam   Constitutional: He is oriented to person, place, and time.   HENT:   Head: Normocephalic and atraumatic.   Eyes: Conjunctivae are normal. No scleral icterus.   Neck: Neck supple. No JVD present.   Cardiovascular: Normal rate.  Exam reveals no gallop.    Pulmonary/Chest: He has no wheezes. He has no rales.   Abdominal: Soft. Bowel sounds are normal. He exhibits no distension. There is no tenderness.   Neurological: He is alert and oriented to person, place, and time.   Skin: Skin is warm. No erythema.   Nursing note and vitals reviewed.      Fluids    Intake/Output Summary (Last 24 hours) at 05/09/19 2896  Last data filed at 05/09/19  0600   Gross per 24 hour   Intake              450 ml   Output             1175 ml   Net             -725 ml       Laboratory  Recent Labs      05/08/19   1815   WBC  9.5   RBC  5.74   HEMOGLOBIN  17.7   HEMATOCRIT  51.3   MCV  89.4   MCH  30.8   MCHC  34.5   RDW  43.0   PLATELETCT  272   MPV  8.8*     Recent Labs      05/08/19   1815   SODIUM  137   POTASSIUM  3.8   CHLORIDE  101   CO2  23   GLUCOSE  128*   BUN  13   CREATININE  0.87   CALCIUM  9.5             Recent Labs      05/09/19   0246   TRIGLYCERIDE  167*   HDL  39*   LDL  96       Imaging  EC-ECHOCARDIOGRAM COMPLETE W/O CONT   Final Result      DX-CHEST-PORTABLE (1 VIEW)   Final Result      No acute cardiopulmonary abnormality.      NM-CARDIAC STRESS TEST    (Results Pending)        Assessment/Plan  * Chest pain   Assessment & Plan    Trop negative so far  Stress test and echo pending   Monitor on tele   Cont on Asa, statin and ACE   Monitor       Type 2 diabetes mellitus (HCC)   Assessment & Plan    Pt states he has DM, but not on any medication as outpt   Will check a A1c.   Monitor      Essential hypertension- (present on admission)   Assessment & Plan    This is chronic, continue home Prinivil.  Stable monitor             VTE prophylaxis: scd

## 2019-05-10 ENCOUNTER — PATIENT OUTREACH (OUTPATIENT)
Dept: HEALTH INFORMATION MANAGEMENT | Facility: OTHER | Age: 74
End: 2019-05-10

## 2019-05-10 ENCOUNTER — APPOINTMENT (OUTPATIENT)
Dept: RADIOLOGY | Facility: MEDICAL CENTER | Age: 74
End: 2019-05-10
Attending: INTERNAL MEDICINE
Payer: MEDICARE

## 2019-05-10 VITALS
OXYGEN SATURATION: 96 % | TEMPERATURE: 98.1 F | RESPIRATION RATE: 18 BRPM | WEIGHT: 198.85 LBS | BODY MASS INDEX: 31.21 KG/M2 | HEART RATE: 89 BPM | SYSTOLIC BLOOD PRESSURE: 121 MMHG | DIASTOLIC BLOOD PRESSURE: 81 MMHG | HEIGHT: 67 IN

## 2019-05-10 LAB
25(OH)D3 SERPL-MCNC: 41 NG/ML (ref 30–100)
ANION GAP SERPL CALC-SCNC: 12 MMOL/L (ref 0–11.9)
BUN SERPL-MCNC: 15 MG/DL (ref 8–22)
CALCIUM SERPL-MCNC: 9.1 MG/DL (ref 8.5–10.5)
CHLORIDE SERPL-SCNC: 104 MMOL/L (ref 96–112)
CO2 SERPL-SCNC: 22 MMOL/L (ref 20–33)
CREAT SERPL-MCNC: 0.92 MG/DL (ref 0.5–1.4)
EKG IMPRESSION: NORMAL
ERYTHROCYTE [DISTWIDTH] IN BLOOD BY AUTOMATED COUNT: 44 FL (ref 35.9–50)
EST. AVERAGE GLUCOSE BLD GHB EST-MCNC: 128 MG/DL
GLUCOSE BLD-MCNC: 171 MG/DL (ref 65–99)
GLUCOSE SERPL-MCNC: 122 MG/DL (ref 65–99)
HBA1C MFR BLD: 6.1 % (ref 0–5.6)
HCT VFR BLD AUTO: 49 % (ref 42–52)
HGB BLD-MCNC: 16.6 G/DL (ref 14–18)
MCH RBC QN AUTO: 30.8 PG (ref 27–33)
MCHC RBC AUTO-ENTMCNC: 33.9 G/DL (ref 33.7–35.3)
MCV RBC AUTO: 90.9 FL (ref 81.4–97.8)
PLATELET # BLD AUTO: 241 K/UL (ref 164–446)
PMV BLD AUTO: 9 FL (ref 9–12.9)
POTASSIUM SERPL-SCNC: 3.8 MMOL/L (ref 3.6–5.5)
RBC # BLD AUTO: 5.39 M/UL (ref 4.7–6.1)
SODIUM SERPL-SCNC: 138 MMOL/L (ref 135–145)
TSH SERPL DL<=0.005 MIU/L-ACNC: 1.61 UIU/ML (ref 0.38–5.33)
WBC # BLD AUTO: 8.4 K/UL (ref 4.8–10.8)

## 2019-05-10 PROCEDURE — 84443 ASSAY THYROID STIM HORMONE: CPT

## 2019-05-10 PROCEDURE — 99217 PR OBSERVATION CARE DISCHARGE: CPT | Performed by: INTERNAL MEDICINE

## 2019-05-10 PROCEDURE — 93010 ELECTROCARDIOGRAM REPORT: CPT | Performed by: INTERNAL MEDICINE

## 2019-05-10 PROCEDURE — 82962 GLUCOSE BLOOD TEST: CPT

## 2019-05-10 PROCEDURE — 700111 HCHG RX REV CODE 636 W/ 250 OVERRIDE (IP)

## 2019-05-10 PROCEDURE — 700102 HCHG RX REV CODE 250 W/ 637 OVERRIDE(OP): Performed by: EMERGENCY MEDICINE

## 2019-05-10 PROCEDURE — 80048 BASIC METABOLIC PNL TOTAL CA: CPT

## 2019-05-10 PROCEDURE — A9270 NON-COVERED ITEM OR SERVICE: HCPCS | Performed by: HOSPITALIST

## 2019-05-10 PROCEDURE — G0378 HOSPITAL OBSERVATION PER HR: HCPCS

## 2019-05-10 PROCEDURE — 700102 HCHG RX REV CODE 250 W/ 637 OVERRIDE(OP): Performed by: HOSPITALIST

## 2019-05-10 PROCEDURE — 85027 COMPLETE CBC AUTOMATED: CPT

## 2019-05-10 PROCEDURE — A9270 NON-COVERED ITEM OR SERVICE: HCPCS | Performed by: EMERGENCY MEDICINE

## 2019-05-10 PROCEDURE — A9502 TC99M TETROFOSMIN: HCPCS

## 2019-05-10 PROCEDURE — 36415 COLL VENOUS BLD VENIPUNCTURE: CPT

## 2019-05-10 RX ORDER — ROSUVASTATIN CALCIUM 20 MG/1
20 TABLET, COATED ORAL EVERY EVENING
Qty: 30 TAB | Refills: 11 | Status: SHIPPED | OUTPATIENT
Start: 2019-05-10

## 2019-05-10 RX ORDER — REGADENOSON 0.08 MG/ML
INJECTION, SOLUTION INTRAVENOUS
Status: COMPLETED
Start: 2019-05-10 | End: 2019-05-10

## 2019-05-10 RX ORDER — ASPIRIN 81 MG/1
81 TABLET ORAL DAILY
Qty: 30 TAB | Refills: 0 | Status: SHIPPED | OUTPATIENT
Start: 2019-05-11

## 2019-05-10 RX ADMIN — OMEPRAZOLE 20 MG: 20 CAPSULE, DELAYED RELEASE ORAL at 05:24

## 2019-05-10 RX ADMIN — ASPIRIN 81 MG: 81 TABLET, COATED ORAL at 05:24

## 2019-05-10 RX ADMIN — LISINOPRIL 10 MG: 10 TABLET ORAL at 05:24

## 2019-05-10 RX ADMIN — SENNOSIDES AND DOCUSATE SODIUM 2 TABLET: 8.6; 5 TABLET ORAL at 05:25

## 2019-05-10 RX ADMIN — REGADENOSON 0.4 MG: 0.08 INJECTION, SOLUTION INTRAVENOUS at 10:17

## 2019-05-10 NOTE — DISCHARGE INSTRUCTIONS
Discharge Instructions    Discharged to home by car with relative. Discharged via walking, hospital escort: Yes.  Special equipment needed: Not Applicable    Be sure to schedule a follow-up appointment with your primary care doctor or any specialists as instructed.     Discharge Plan:   Diet Plan: Discussed  Activity Level: Discussed  Confirmed Follow up Appointment: Appointment Scheduled  Confirmed Symptoms Management: Discussed  Medication Reconciliation Updated: Yes  Influenza Vaccine Indication: Not indicated: Previously immunized this influenza season and > 8 years of age    I understand that a diet low in cholesterol, fat, and sodium is recommended for good health. Unless I have been given specific instructions below for another diet, I accept this instruction as my diet prescription.   Other diet: Diabetic    Special Instructions: None    · Is patient discharged on Warfarin / Coumadin?   No     Depression / Suicide Risk    As you are discharged from this [facility] facility, it is important to learn how to keep safe from harming yourself.    Recognize the warning signs:  · Abrupt changes in personality, positive or negative- including increase in energy   · Giving away possessions  · Change in eating patterns- significant weight changes-  positive or negative  · Change in sleeping patterns- unable to sleep or sleeping all the time   · Unwillingness or inability to communicate  · Depression  · Unusual sadness, discouragement and loneliness  · Talk of wanting to die  · Neglect of personal appearance   · Rebelliousness- reckless behavior  · Withdrawal from people/activities they love  · Confusion- inability to concentrate     If you or a loved one observes any of these behaviors or has concerns about self-harm, here's what you can do:  · Talk about it- your feelings and reasons for harming yourself  · Remove any means that you might use to hurt yourself (examples: pills, rope, extension cords, firearm)  · Get  professional help from the community (Mental Health, Substance Abuse, psychological counseling)  · Do not be alone:Call your Safe Contact- someone whom you trust who will be there for you.  · Call your local CRISIS HOTLINE 857-9292 or 236-631-2481  · Call your local Children's Mobile Crisis Response Team [place] (059) 716-5040 or www.[employer]  · Call the toll free National Suicide Prevention Hotlines   · National Suicide Prevention Lifeline 890-139-XBIA (5515)  · [employer] Hope Line Network 800-SUICIDE (797-7227)    Chest Pain, Nonspecific  It is often hard to give a specific diagnosis for the cause of chest pain. There is always a chance that your pain could be related to something serious, like a heart attack or a blood clot in the lungs. You need to follow up with your caregiver for further evaluation. More lab tests or other studies such as X-rays, electrocardiography, stress testing, or cardiac imaging may be needed to find the cause of your pain.  Most of the time, nonspecific chest pain improves within 2 to 3 days with rest and mild pain medicine. For the next few days, avoid physical exertion or activities that bring on pain. Do not smoke. Avoid drinking alcohol. Call your caregiver for routine follow-up as advised.   SEEK IMMEDIATE MEDICAL CARE IF:  · You develop increased chest pain or pain that radiates to the arm, neck, jaw, back, or abdomen.   · You develop shortness of breath, increased coughing, or you start coughing up blood.   · You have severe back or abdominal pain, nausea, or vomiting.   · You develop severe weakness, fainting, fever, or chills.   Document Released: 12/18/2006 Document Revised: 03/11/2013 Document Reviewed: 06/06/2008  ExitCare® Patient Information ©2013 [employer].      Hypertension  Hypertension is another name for high blood pressure. High blood pressure forces your heart to work harder to pump blood. A blood pressure reading has two numbers, which includes a  higher number over a lower number (example: 110/72).  Follow these instructions at home:  · Have your blood pressure rechecked by your doctor.  · Only take medicine as told by your doctor. Follow the directions carefully. The medicine does not work as well if you skip doses. Skipping doses also puts you at risk for problems.  · Do not smoke.  · Monitor your blood pressure at home as told by your doctor.  Contact a doctor if:  · You think you are having a reaction to the medicine you are taking.  · You have repeat headaches or feel dizzy.  · You have puffiness (swelling) in your ankles.  · You have trouble with your vision.  Get help right away if:  · You get a very bad headache and are confused.  · You feel weak, numb, or faint.  · You get chest or belly (abdominal) pain.  · You throw up (vomit).  · You cannot breathe very well.  This information is not intended to replace advice given to you by your health care provider. Make sure you discuss any questions you have with your health care provider.  Document Released: 06/05/2009 Document Revised: 05/25/2017 Document Reviewed: 10/10/2014  [employer] Interactive Patient Education © 2017 [employer]      ·  Diabetes Mellitus and Food  It is important for you to manage your blood sugar (glucose) level. Your blood glucose level can be greatly affected by what you eat. Eating healthier foods in the appropriate amounts throughout the day at about the same time each day will help you control your blood glucose level. It can also help slow or prevent worsening of your diabetes mellitus. Healthy eating may even help you improve the level of your blood pressure and reach or maintain a healthy weight.  General recommendations for healthful eating and cooking habits include:  Eating meals and snacks regularly. Avoid going long periods of time without eating to lose weight.  Eating a diet that consists mainly of plant-based foods, such as fruits, vegetables, nuts, legumes, and  whole grains.  Using low-heat cooking methods, such as baking, instead of high-heat cooking methods, such as deep frying.  Work with your dietitian to make sure you understand how to use the Nutrition Facts information on food labels.  How can food affect me?  Carbohydrates   Carbohydrates affect your blood glucose level more than any other type of food. Your dietitian will help you determine how many carbohydrates to eat at each meal and teach you how to count carbohydrates. Counting carbohydrates is important to keep your blood glucose at a healthy level, especially if you are using insulin or taking certain medicines for diabetes mellitus.  Alcohol   Alcohol can cause sudden decreases in blood glucose (hypoglycemia), especially if you use insulin or take certain medicines for diabetes mellitus. Hypoglycemia can be a life-threatening condition. Symptoms of hypoglycemia (sleepiness, dizziness, and disorientation) are similar to symptoms of having too much alcohol.  If your health care provider has given you approval to drink alcohol, do so in moderation and use the following guidelines:  Women should not have more than one drink per day, and men should not have more than two drinks per day. One drink is equal to:  12 oz of beer.  5 oz of wine.  1½ oz of hard liquor.  Do not drink on an empty stomach.  Keep yourself hydrated. Have water, diet soda, or unsweetened iced tea.  Regular soda, juice, and other mixers might contain a lot of carbohydrates and should be counted.  What foods are not recommended?  As you make food choices, it is important to remember that all foods are not the same. Some foods have fewer nutrients per serving than other foods, even though they might have the same number of calories or carbohydrates. It is difficult to get your body what it needs when you eat foods with fewer nutrients. Examples of foods that you should avoid that are high in calories and carbohydrates but low in nutrients  include:  Trans fats (most processed foods list trans fats on the Nutrition Facts label).  Regular soda.  Juice.  Candy.  Sweets, such as cake, pie, doughnuts, and cookies.  Fried foods.  What foods can I eat?  Eat nutrient-rich foods, which will nourish your body and keep you healthy. The food you should eat also will depend on several factors, including:  The calories you need.  The medicines you take.  Your weight.  Your blood glucose level.  Your blood pressure level.  Your cholesterol level.  You should eat a variety of foods, including:  Protein.  Lean cuts of meat.  Proteins low in saturated fats, such as fish, egg whites, and beans. Avoid processed meats.  Fruits and vegetables.  Fruits and vegetables that may help control blood glucose levels, such as apples, mangoes, and yams.  Dairy products.  Choose fat-free or low-fat dairy products, such as milk, yogurt, and cheese.  Grains, bread, pasta, and rice.  Choose whole grain products, such as multigrain bread, whole oats, and brown rice. These foods may help control blood pressure.  Fats.  Foods containing healthful fats, such as nuts, avocado, olive oil, canola oil, and fish.  Does everyone with diabetes mellitus have the same meal plan?  Because every person with diabetes mellitus is different, there is not one meal plan that works for everyone. It is very important that you meet with a dietitian who will help you create a meal plan that is just right for you.  This information is not intended to replace advice given to you by your health care provider. Make sure you discuss any questions you have with your health care provider.  Document Released: 09/14/2006 Document Revised: 05/25/2017 Document Reviewed: 11/14/2014  [employer] Interactive Patient Education © 2017 [employer].      Depression / Suicide Risk    As you are discharged from this [facility] facility, it is important to learn how to keep safe from harming yourself.    Recognize the warning  signs:  · Abrupt changes in personality, positive or negative- including increase in energy   · Giving away possessions  · Change in eating patterns- significant weight changes-  positive or negative  · Change in sleeping patterns- unable to sleep or sleeping all the time   · Unwillingness or inability to communicate  · Depression  · Unusual sadness, discouragement and loneliness  · Talk of wanting to die  · Neglect of personal appearance   · Rebelliousness- reckless behavior  · Withdrawal from people/activities they love  · Confusion- inability to concentrate     If you or a loved one observes any of these behaviors or has concerns about self-harm, here's what you can do:  · Talk about it- your feelings and reasons for harming yourself  · Remove any means that you might use to hurt yourself (examples: pills, rope, extension cords, firearm)  · Get professional help from the community (Mental Health, Substance Abuse, psychological counseling)  · Do not be alone:Call your Safe Contact- someone whom you trust who will be there for you.  · Call your local CRISIS HOTLINE 801-5617 or 450-050-9660  · Call your local Children's Mobile Crisis Response Team [place] (583) 989-1516 or www.[employer]  · Call the toll free National Suicide Prevention Hotlines   · National Suicide Prevention Lifeline 816-390-OKAS (8333)  · National Hope Line Network 800-SUICIDE (343-3838)    Follow up with pcp

## 2019-05-10 NOTE — PROGRESS NOTES
Patient discharged with all belongings via wheelchair. Patient's wife will transport him home via private vehicle. Discharge instructions reviewed with patient and all of his questions were answered. IV was removed and pressure applied.

## 2019-05-10 NOTE — CARE PLAN
Problem: Safety  Goal: Will remain free from injury  Outcome: PROGRESSING AS EXPECTED  Bed is locked and in lowest position. Pt educated about calling for assistance as needed. Pt is steady on feet and up self.     Problem: Knowledge Deficit  Goal: Knowledge of the prescribed therapeutic regimen will improve  Outcome: PROGRESSING AS EXPECTED  Pt asks questions about treatment plan as they arise. Pt is educated on medications, treatment and plan of care. Pt actively participates in plan of care.

## 2019-05-10 NOTE — DISCHARGE SUMMARY
Discharge Summary    CHIEF COMPLAINT ON ADMISSION  Chief Complaint   Patient presents with   • Chest Pain     across chest, since last night, described as pressure, worse when laying down   • Dizziness       Reason for Admission  Chest Pain     Admission Date  5/8/2019    CODE STATUS  Full Code    HPI & HOSPITAL COURSE    This is a 72 y/o M who was admitted on 5/8/19 after presenting to ER complaining of chest pain. Pt was admitted for further work up.  A stress  Test and echo were done and were negative for acute issuers. Pt feeling better today, no chest pain. His A1c is only 6.1  Will need life style modification at this time for his DM. follow up with PCP.  Pt will be discharged home today       The patient met 2-midnight criteria for an inpatient stay at the time of discharge.    Discharge Date  5/10/19    FOLLOW UP ITEMS POST DISCHARGE  PCP    DISCHARGE DIAGNOSES  Principal Problem:    Chest pain POA: Unknown  Active Problems:    Essential hypertension POA: Yes      Overview: ICD-10 transition    Type 2 diabetes mellitus (HCC) POA: Unknown  Resolved Problems:    * No resolved hospital problems. *      FOLLOW UP  No future appointments.  Vlad Curry M.D.  [address] [facility] [address]  [phone]            MEDICATIONS ON DISCHARGE     Medication List      START taking these medications      Instructions   aspirin 81 MG EC tablet  Start taking on:  5/11/2019   Take 1 Tab by mouth every day.  Dose:  81 mg     rosuvastatin 20 MG Tabs  Commonly known as:  CRESTOR   Take 1 Tab by mouth every evening.  Dose:  20 mg        CONTINUE taking these medications      Instructions   docusate sodium 100 MG Caps  Commonly known as:  COLACE   Take 100 mg by mouth 2 times a day.  Dose:  100 mg     lisinopril 10 MG Tabs  Commonly known as:  PRINIVIL   Take 10 mg by mouth every morning.  Dose:  10 mg     MIRALAX Powd  Generic drug:  polyethylene glycol 3350   Take 17 g by mouth every day.  Dose:  17 g      pantoprazole 40 MG Tbec  Commonly known as:  PROTONIX   Take 40 mg by mouth every morning.  Dose:  40 mg     simethicone 125 MG chewable tablet  Commonly known as:  MYLICON   Take 125 mg by mouth 2 Times a Day.  Dose:  125 mg            Allergies  Allergies   Allergen Reactions   • Bloodless    • Morphine Shortness of Breath     Pt states he had trouble breathing       DIET  Orders Placed This Encounter   Procedures   • Diet Order Diabetic     Standing Status:   Standing     Number of Occurrences:   1     Order Specific Question:   Diet:     Answer:   Diabetic [3]       ACTIVITY  As tolerated.  Weight bearing as tolerated    CONSULTATIONS  None     PROCEDURES  None     LABORATORY  Lab Results   Component Value Date    SODIUM 138 05/10/2019    POTASSIUM 3.8 05/10/2019    CHLORIDE 104 05/10/2019    CO2 22 05/10/2019    GLUCOSE 122 (H) 05/10/2019    BUN 15 05/10/2019    CREATININE 0.92 05/10/2019        Lab Results   Component Value Date    WBC 8.4 05/10/2019    HEMOGLOBIN 16.6 05/10/2019    HEMATOCRIT 49.0 05/10/2019    PLATELETCT 241 05/10/2019        Total time of the discharge process exceeds 39 minutes.

## 2019-05-10 NOTE — PROGRESS NOTES
Report received from day shift RN, assumed care of pt. Pt A&O4, in no apparent distress, no reports pain. Plan of care discussed with pt, no questions or needs at this time. Tele box on, rhythm verified. Call light within reach, bed locked and in lowest position. All fall precautions and hourly rounding in place. Will continue to monitor.

## 2023-03-14 PROBLEM — M25.569 PAIN OF PATELLOFEMORAL JOINT: Status: ACTIVE | Noted: 2023-03-14

## 2023-03-14 PROBLEM — M17.0 PRIMARY OSTEOARTHRITIS OF BOTH KNEES: Status: ACTIVE | Noted: 2023-03-14
